# Patient Record
Sex: FEMALE
[De-identification: names, ages, dates, MRNs, and addresses within clinical notes are randomized per-mention and may not be internally consistent; named-entity substitution may affect disease eponyms.]

---

## 2023-09-15 PROBLEM — Z00.00 ENCOUNTER FOR PREVENTIVE HEALTH EXAMINATION: Status: ACTIVE | Noted: 2023-09-15

## 2023-09-18 ENCOUNTER — APPOINTMENT (OUTPATIENT)
Dept: UROLOGY | Facility: CLINIC | Age: 23
End: 2023-09-18
Payer: COMMERCIAL

## 2023-09-18 ENCOUNTER — LABORATORY RESULT (OUTPATIENT)
Age: 23
End: 2023-09-18

## 2023-09-18 DIAGNOSIS — Z78.9 OTHER SPECIFIED HEALTH STATUS: ICD-10-CM

## 2023-09-18 DIAGNOSIS — N39.41 URGE INCONTINENCE: ICD-10-CM

## 2023-09-18 DIAGNOSIS — R39.15 URGENCY OF URINATION: ICD-10-CM

## 2023-09-18 PROCEDURE — 99204 OFFICE O/P NEW MOD 45 MIN: CPT

## 2023-09-18 PROCEDURE — 51798 US URINE CAPACITY MEASURE: CPT

## 2023-09-18 RX ORDER — SOLIFENACIN SUCCINATE 10 MG/1
10 TABLET ORAL DAILY
Qty: 30 | Refills: 5 | Status: ACTIVE | COMMUNITY
Start: 2023-09-18 | End: 1900-01-01

## 2023-09-18 RX ORDER — ARIPIPRAZOLE 2 MG/1
TABLET ORAL
Refills: 0 | Status: ACTIVE | COMMUNITY

## 2023-09-18 RX ORDER — BUPROPION HYDROCHLORIDE 100 MG/1
TABLET, FILM COATED ORAL
Refills: 0 | Status: ACTIVE | COMMUNITY

## 2023-09-18 RX ORDER — LAMOTRIGINE 25 MG/1
TABLET ORAL
Refills: 0 | Status: ACTIVE | COMMUNITY

## 2023-09-19 LAB
APPEARANCE: CLEAR
BILIRUBIN URINE: NEGATIVE
BLOOD URINE: NEGATIVE
COLOR: YELLOW
GLUCOSE QUALITATIVE U: NEGATIVE MG/DL
KETONES URINE: NEGATIVE MG/DL
LEUKOCYTE ESTERASE URINE: ABNORMAL
NITRITE URINE: NEGATIVE
PH URINE: 7.5
PROTEIN URINE: NEGATIVE MG/DL
SPECIFIC GRAVITY URINE: 1.01
UROBILINOGEN URINE: 0.2 MG/DL

## 2023-12-18 ENCOUNTER — APPOINTMENT (OUTPATIENT)
Dept: UROLOGY | Facility: CLINIC | Age: 23
End: 2023-12-18

## 2023-12-20 NOTE — REASON FOR VISIT
[TextEntry] : 22 year old female who presents for urgency, UUI, and recurrent UTI.  Pt initially seen on 2023. to recap: pt with a 2 year history of episodes of urgency, UUI. She voids q2 hours and denies incomplete emptying, straining to void. These episodes last for a month and then she reverts back to normal voiding with urgency. She also reports hx of possible UTI that occur every other month. She wakes up with urgency/frequency, and the sensations usually disappear after 1 day. She does not take abx for this. She mainly drinks water. There are no inciting events for UTIs; she is sexually active but does not feel there is a correlate post-coital.  She is .She reports yeast and BV issues but does not generally treat them with antibiotics. When she has yeast infection, she reports itching and dyspareunia. She was recently checked for STDs by her Gyn which was negative. She has regular menses and is not on OCP.  She has regular BMs. She drinks +++coffee and water throughout the day She is on meds for anxiety and depression she denies back issues, neuropathy. She denies neurologic symptoms such as visual blurring. She is occasionally dizzy.  Genitourinary: the bladder was normal on palpation and normal external genitalia . negative CST; + increased tone right pelvic floor, nontender pelvic floor. pt advised to avoid bladder irritants, drink water, start vesicare 10mg, referral to PFPT. Also discussed UTI prevention.